# Patient Record
Sex: MALE | Race: OTHER | Employment: STUDENT
[De-identification: names, ages, dates, MRNs, and addresses within clinical notes are randomized per-mention and may not be internally consistent; named-entity substitution may affect disease eponyms.]

---

## 2022-03-18 PROBLEM — S52.92XA CLOSED FRACTURE OF LEFT RADIUS AND ULNA: Status: ACTIVE | Noted: 2017-08-04

## 2022-03-18 PROBLEM — S52.202A CLOSED FRACTURE OF LEFT RADIUS AND ULNA: Status: ACTIVE | Noted: 2017-08-04

## 2022-07-29 ENCOUNTER — TELEMEDICINE (OUTPATIENT)
Dept: PRIMARY CARE CLINIC | Facility: CLINIC | Age: 22
End: 2022-07-29
Payer: COMMERCIAL

## 2022-07-29 DIAGNOSIS — M25.512 CHRONIC LEFT SHOULDER PAIN: Primary | ICD-10-CM

## 2022-07-29 DIAGNOSIS — Z86.19 HISTORY OF CHLAMYDIA: ICD-10-CM

## 2022-07-29 DIAGNOSIS — G89.29 CHRONIC LEFT SHOULDER PAIN: Primary | ICD-10-CM

## 2022-07-29 DIAGNOSIS — Z98.890 HISTORY OF REPAIR OF ACL: ICD-10-CM

## 2022-07-29 PROCEDURE — 99203 OFFICE O/P NEW LOW 30 MIN: CPT | Performed by: FAMILY MEDICINE

## 2022-07-29 ASSESSMENT — PATIENT HEALTH QUESTIONNAIRE - PHQ9
SUM OF ALL RESPONSES TO PHQ9 QUESTIONS 1 & 2: 0
1. LITTLE INTEREST OR PLEASURE IN DOING THINGS: 0
2. FEELING DOWN, DEPRESSED OR HOPELESS: 0
SUM OF ALL RESPONSES TO PHQ QUESTIONS 1-9: 0

## 2022-07-29 ASSESSMENT — ENCOUNTER SYMPTOMS
GASTROINTESTINAL NEGATIVE: 1
RESPIRATORY NEGATIVE: 1
EYES NEGATIVE: 1

## 2022-07-29 NOTE — PROGRESS NOTES
[id number] [address] [facility] [address] Lizette Bright   Office : [phone]  Fax : [phone]    7/29/2022    Subjective: The patient is a 22 y.o. male  who presents for left shoulder pain has sharp pain in the anterior part of the shoulder this been going off and on for several weeks. No recent injury. Remote injury 2000 left arm, closed fracture radius ulna however no residual problems from that injury. He also has a history of ACL tear and repair playing football. And denies any residual problems on the knee. Remote history STD. He denies any concern about STD discharge at present. He is helping his mom who has several medical problems. 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 Denies smoking alcohol or drug abuse. He has fairly good range of movement of left shoulder some sharp pain that comes anteriorly. No past medical history on file.     Social History     Socioeconomic History    Marital status: Single     Spouse name: Not on file    Number of children: Not on file    Years of education: Not on file    Highest education level: Not on file   Occupational History    Not on file   Tobacco Use    Smoking status: Never    Smokeless tobacco: Never   Substance and Sexual Activity    Alcohol use: Never    Drug use: Never    Sexual activity: Not on file   Other Topics Concern    Not on file   Social History Narrative    Not on file     Social Determinants of Health     Financial Resource Strain: Not on file   Food Insecurity: Not on file   Transportation Needs: Not on file   Physical Activity: Not on file   Stress: Not on file   Social Connections: Not on file   Intimate Partner Violence: Not on file   Housing Stability: Not on file       No Known Allergies    Current Outpatient Medications   Medication Sig Dispense Refill    diclofenac (VOLTAREN) 50 MG EC tablet 1 twice a day as needed for pain with food 60 tablet 3     No current facility-administered medications for this visit. Review of Systems   Constitutional: Negative. Negative for activity change, fatigue and fever. HENT: Negative. Eyes: Negative. Respiratory: Negative. Cardiovascular: Negative. Gastrointestinal: Negative. Endocrine: Negative. Genitourinary: Negative. Negative for decreased urine volume, difficulty urinating, penile discharge, penile pain, penile swelling, scrotal swelling and urgency. Musculoskeletal:         Left shoulder pain no recent injury   Allergic/Immunologic: Negative for environmental allergies and food allergies. Neurological: Negative. Hematological: Negative. Psychiatric/Behavioral: Negative. Negative for agitation, behavioral problems, decreased concentration and hallucinations. Objective: Gross head ENT examination normal quite cheerful. No neurological deficits normal gait. Left shoulder essentially normal range of movement there is no obvious deformity he complains of sharp pain that comes and goes anteriorly. He has no residual problems from previous radius ulna fracture. There is no neurological deficits. There were no vitals taken for this visit. No visits with results within 1 Month(s) from this visit.    Latest known visit with results is:   Office Visit on 02/11/2020   Component Date Value Ref Range Status    WBC 02/11/2020 7.1  3.4 - 10.8 x10E3/uL Final    RBC 02/11/2020 5.36  4.14 - 5.80 x10E6/uL Final    Hemoglobin 02/11/2020 14.5  13.0 - 17.7 g/dL Final    Hematocrit 02/11/2020 45.1  37.5 - 51.0 % Final    MCV 02/11/2020 84  79 - 97 fL Final    MCH 02/11/2020 27.1  26.6 - 33.0 pg Final    MCHC 02/11/2020 32.2  31.5 - 35.7 g/dL Final    RDW 02/11/2020 12.8  11.6 - 15.4 % Final    Platelets 72/40/1948 224  150 - 450 x10E3/uL Final    CHLAMYDIA BY FREDDY, [id number] 02/11/2020 Negative  Negative Final    GONOCOCCUS BY FREDDY, [id number] 02/11/2020 Negative  Negative Final    TRICH VAG BY FREDDY 02/11/2020 Negative  Negative Final    Color, UA 02/11/2020 Yellow  Straw-Yellow Final    CLARITY 02/11/2020 Clear  Clear Final    Glucose, Ur 02/11/2020 Negative  Negative mg/dL Final    Bilirubin, Urine 02/11/2020 Negative  Negative mg/dL Final    Ketones, Urine 02/11/2020 Negative  Negative mg/dL Final    Specific Gravity, UA 02/11/2020 1.025  1.000 - 1.030 NA Final    Blood, Urine 02/11/2020 7.0  5.0 - 8.0 NA Final    Blood, Urine 02/11/2020 Negative  Negative VINCENT/UL Final    Protein, UA 02/11/2020 Negative  Negative mg/dL Final    Urobilinogen, UA, POCT 02/11/2020 0.2 E.U./dL  0.0 - 1.0 E.U./dL Final    Nitrite, Urine 02/11/2020 Negative  Negative Final    Leukocyte Esterase, Urine 02/11/2020 Negative  Negative Yasmin/uL Final    RPR 02/11/2020 Non Reactive  Non Reactive Final       Physical Exam    ASSESSMENT/PLAN:    Miguelito was seen today for new patient and shoulder pain. Diagnoses and all orders for this visit:    Chronic left shoulder pain  -     diclofenac (VOLTAREN) 50 MG EC tablet; 1 twice a day as needed for pain with food    History of repair of ACL    History of chlamydia          No follow-up provider specified. Hemalatha Hunter is a 22 y.o. male who was seen by synchronous (real-time) audio-video technology on 7/29/2022. Consent:  He and/or his healthcare decision maker is aware that this patient-initiated Telehealth encounter is a billable service, with coverage as determined by his insurance carrier. He is aware that he may receive a bill and has provided verbal consent to proceed: Home independently    I was independent without device while conducting this encounter. Assessment & Plan:   Stantonfavian Salter was seen today for new patient and shoulder pain.     Diagnoses and all orders for this visit:    Chronic left shoulder pain  -     diclofenac (VOLTAREN) 50 MG EC tablet; 1 twice a day as needed for pain with food    History of repair of ACL    History of chlamydia    Chronic nonspecific left

## 2022-08-02 ENCOUNTER — OFFICE VISIT (OUTPATIENT)
Dept: FAMILY MEDICINE CLINIC | Facility: CLINIC | Age: 22
End: 2022-08-02
Payer: COMMERCIAL

## 2022-08-02 VITALS
WEIGHT: 160.8 LBS | HEART RATE: 69 BPM | DIASTOLIC BLOOD PRESSURE: 66 MMHG | OXYGEN SATURATION: 99 % | BODY MASS INDEX: 23.82 KG/M2 | HEIGHT: 69 IN | RESPIRATION RATE: 19 BRPM | SYSTOLIC BLOOD PRESSURE: 126 MMHG

## 2022-08-02 DIAGNOSIS — M25.512 ACUTE PAIN OF LEFT SHOULDER: Primary | ICD-10-CM

## 2022-08-02 DIAGNOSIS — F43.9 SITUATIONAL STRESS: ICD-10-CM

## 2022-08-02 DIAGNOSIS — R30.0 DYSURIA: ICD-10-CM

## 2022-08-02 DIAGNOSIS — Z00.00 LABORATORY EXAM ORDERED AS PART OF ROUTINE GENERAL MEDICAL EXAMINATION: ICD-10-CM

## 2022-08-02 DIAGNOSIS — Z86.19 HISTORY OF CHLAMYDIA: ICD-10-CM

## 2022-08-02 DIAGNOSIS — Z23 ENCOUNTER FOR IMMUNIZATION: ICD-10-CM

## 2022-08-02 PROBLEM — G89.29 CHRONIC LEFT SHOULDER PAIN: Status: RESOLVED | Noted: 2022-07-29 | Resolved: 2022-08-02

## 2022-08-02 PROBLEM — S52.92XA CLOSED FRACTURE OF LEFT RADIUS AND ULNA: Status: RESOLVED | Noted: 2017-08-04 | Resolved: 2022-08-02

## 2022-08-02 PROBLEM — S52.202A CLOSED FRACTURE OF LEFT RADIUS AND ULNA: Status: RESOLVED | Noted: 2017-08-04 | Resolved: 2022-08-02

## 2022-08-02 PROBLEM — Z98.890 HISTORY OF REPAIR OF ACL: Status: RESOLVED | Noted: 2022-07-29 | Resolved: 2022-08-02

## 2022-08-02 LAB
BILIRUBIN, URINE, POC: NEGATIVE
BLOOD URINE, POC: NEGATIVE
GLUCOSE URINE, POC: NEGATIVE
KETONES, URINE, POC: NEGATIVE
LEUKOCYTE ESTERASE, URINE, POC: NEGATIVE
NITRITE, URINE, POC: NEGATIVE
PH, URINE, POC: 7 (ref 4.6–8)
PROTEIN,URINE, POC: NEGATIVE
SPECIFIC GRAVITY, URINE, POC: 1.02 (ref 1–1.03)
URINALYSIS CLARITY, POC: CLEAR
URINALYSIS COLOR, POC: YELLOW
UROBILINOGEN, POC: NORMAL

## 2022-08-02 PROCEDURE — 99214 OFFICE O/P EST MOD 30 MIN: CPT | Performed by: NURSE PRACTITIONER

## 2022-08-02 PROCEDURE — 81003 URINALYSIS AUTO W/O SCOPE: CPT | Performed by: NURSE PRACTITIONER

## 2022-08-02 RX ORDER — DOXYCYCLINE HYCLATE 100 MG/1
100 CAPSULE ORAL 2 TIMES DAILY
Qty: 14 CAPSULE | Refills: 0 | Status: SHIPPED | OUTPATIENT
Start: 2022-08-02 | End: 2022-08-09

## 2022-08-02 ASSESSMENT — ENCOUNTER SYMPTOMS
ALLERGIC/IMMUNOLOGIC NEGATIVE: 1
TROUBLE SWALLOWING: 0
ABDOMINAL DISTENTION: 0
CONSTIPATION: 0
SINUS PAIN: 0
ABDOMINAL PAIN: 0
SORE THROAT: 0
RESPIRATORY NEGATIVE: 1
COLOR CHANGE: 0
ANAL BLEEDING: 0
COUGH: 0
VOICE CHANGE: 0
WHEEZING: 0
NAUSEA: 0
GASTROINTESTINAL NEGATIVE: 1
STRIDOR: 0
EYE PAIN: 0
RHINORRHEA: 0
EYE DISCHARGE: 0
BLOOD IN STOOL: 0
EYES NEGATIVE: 1
BACK PAIN: 0
RECTAL PAIN: 0
DIARRHEA: 0
CHEST TIGHTNESS: 0
SINUS PRESSURE: 0
FACIAL SWELLING: 0
SHORTNESS OF BREATH: 0
VOMITING: 0

## 2022-08-02 ASSESSMENT — PATIENT HEALTH QUESTIONNAIRE - PHQ9
SUM OF ALL RESPONSES TO PHQ QUESTIONS 1-9: 8
8. MOVING OR SPEAKING SO SLOWLY THAT OTHER PEOPLE COULD HAVE NOTICED. OR THE OPPOSITE, BEING SO FIGETY OR RESTLESS THAT YOU HAVE BEEN MOVING AROUND A LOT MORE THAN USUAL: 0
5. POOR APPETITE OR OVEREATING: 2
7. TROUBLE CONCENTRATING ON THINGS, SUCH AS READING THE NEWSPAPER OR WATCHING TELEVISION: 3
SUM OF ALL RESPONSES TO PHQ9 QUESTIONS 1 & 2: 3
3. TROUBLE FALLING OR STAYING ASLEEP: 0
SUM OF ALL RESPONSES TO PHQ QUESTIONS 1-9: 8
6. FEELING BAD ABOUT YOURSELF - OR THAT YOU ARE A FAILURE OR HAVE LET YOURSELF OR YOUR FAMILY DOWN: 0
SUM OF ALL RESPONSES TO PHQ QUESTIONS 1-9: 8
10. IF YOU CHECKED OFF ANY PROBLEMS, HOW DIFFICULT HAVE THESE PROBLEMS MADE IT FOR YOU TO DO YOUR WORK, TAKE CARE OF THINGS AT HOME, OR GET ALONG WITH OTHER PEOPLE: 1
9. THOUGHTS THAT YOU WOULD BE BETTER OFF DEAD, OR OF HURTING YOURSELF: 0
4. FEELING TIRED OR HAVING LITTLE ENERGY: 0
1. LITTLE INTEREST OR PLEASURE IN DOING THINGS: 2
SUM OF ALL RESPONSES TO PHQ QUESTIONS 1-9: 8
2. FEELING DOWN, DEPRESSED OR HOPELESS: 1

## 2022-08-02 ASSESSMENT — ANXIETY QUESTIONNAIRES
5. BEING SO RESTLESS THAT IT IS HARD TO SIT STILL: 0
GAD7 TOTAL SCORE: 8
4. TROUBLE RELAXING: 2
3. WORRYING TOO MUCH ABOUT DIFFERENT THINGS: 1
7. FEELING AFRAID AS IF SOMETHING AWFUL MIGHT HAPPEN: 0
1. FEELING NERVOUS, ANXIOUS, OR ON EDGE: 2
2. NOT BEING ABLE TO STOP OR CONTROL WORRYING: 0
6. BECOMING EASILY ANNOYED OR IRRITABLE: 3

## 2022-08-02 NOTE — PROGRESS NOTES
[address], [address] [place] [address]  Phone: [phone] Fax [phone]  Randa Lynn. Leandra MS, APRN, FNP-C  8/2/2022   Chief Complaint   Patient presents with    New Patient     Pt is new to this office but not to the system. Here today to estab care. Pt is coming from [address] [place] [address],[address]. Last seen by him 7/29/22. Pt denies having any sig PMH and denies taking any medications. Pt has a few concerns today    Shoulder Pain     Pt reports having left shoulder pain off and on for past few weeks. No known injury. Worse when doing lateral arm raise exercises. Not taking anything for the pain. Denies any edema, erythema, or increased warmth to left shoulder. Denies any weakness to left arm/hand    Dysuria     Pt reports being treated for chlamydia with abx in August 2021. Reports symptoms resolved and pt denies being sexually active since that point. Pt reports however recently developing some mild dysuria over past few days. Denies any penile lesions or d/c, scrotal edema, testicular pain, urgency, frequency, flank pain, phuong hematuria, fever, N/V/D/abd pain etc. Pt concerned that chlamydia may have only been partially treated. Wishes to be retested. Stress     Pt reports having situational stress related to recently having to quit school to come home to help take care of his mother who has liver cancer. Pt denies any sig depression or anxiety however. Has good support from his family and a male mentor. Pt denies any SI, HI, hallucinations. Pt denies needing medications. Is interested in a referral for counseling          ASSESSMENT/PLAN:  Below is the assessment and plan developed based on review of pertinent history, physical exam, labs, studies, and medications. 1. Acute pain of left shoulder  Pt reports having left shoulder pain off and on for past few weeks. No known injury. Worse when doing lateral arm raise exercises.  Not taking anything for the pain. Denies any edema, erythema, or increased warmth to left shoulder. Denies any weakness to left arm/hand. On physical exam today: Pt had mild ttp noted to anterior aspect of left shoulder. No increased edema, erythema, warmth noted. No focal weakness noted to left arm/hand. ROM intact but with mild discomfort on lateral arm raise. Rest of MSK WNL. Gait steady and unassisted. Discussed with pt. Will give pt PRN Diclofenac as directed to see if pain improves. Will also get an x-ray of left shoulder to further evaluate. Pt to f/u with me in 4 weeks to recheck. Agrees to call sooner for concerns/new or worsening symptoms. If not improving by f/u or if left shoulder x-ray is abnormal, will consider referral to PT vs POA. Will monitor.     - diclofenac (VOLTAREN) 50 MG EC tablet; Take 1 tablet by mouth 2 times daily as needed for Pain  Dispense: 60 tablet; Refill: 2  - XR SHOULDER LEFT (MIN 2 VIEWS); Future    2. Dysuria  3. History of chlamydia  Pt reports being treated for chlamydia with abx in August 2021. Reports symptoms resolved and pt denies being sexually active since that point. Pt reports however recently developing some mild dysuria over past few days. Denies any penile lesions or d/c, scrotal edema, testicular pain, urgency, frequency, flank pain, phoung hematuria, fever, N/V/D/abd pain etc. Pt concerned that chlamydia may have only been partially treated. Wishes to be retested. Pt had normal  exam today. UA was WNL today. Discussed with pt. Checked allergies. Given symptoms and hx, will cover empirically with Doxy while awaiting STD screening and urine culture to come back as Doxy is good coverage for UTI and chlamydia. If urine culture and STD screen is negative, pt can stop the Doxy. If chlamydia is positive, Doxy given will cover. If positive for M. Genitalium or gonorrhea, will need further abx coverage.  Pt agrees to stay well hydrated and to avoid any sexual contact until all results are back and proper tx is given. Pt to f/u with me in 4 week. Agrees to call sooner for concerns/new or worsening symptoms.     - CT+NG+M Genitalium by FREDDY, Ur; Future  - AMB POC URINALYSIS DIP STICK AUTO W/O MICRO  - Culture, Urine; Future  - CBC with Auto Differential; Future  - Comprehensive Metabolic Panel; Future  - doxycycline hyclate (VIBRAMYCIN) 100 MG capsule; Take 1 capsule by mouth in the morning and 1 capsule before bedtime. Do all this for 7 days. Dispense: 14 capsule; Refill: 0    4. Situational stress  Pt reports having situational stress related to recently having to quit school to come home to help take care of his mother who has liver cancer. Pt denies any sig depression or anxiety however. Has good support from his family and a male mentor. Pt denies any SI, HI, hallucinations. Pt denies needing medications. Is interested in a referral for counseling. Discussed with pt. Will refer pt to Fahe[facility] Rivera for counseling. I feel that he will greatly benefit from this. Pt encouraged to continue to spend as much time as possible with his family and mentor. Pt to f/u with me in 4 weeks. Agrees to call sooner for concerns/new or worsening symptoms.     - [id number] [facility]    5. Laboratory exam ordered as part of routine general medical examination  Pt to return fasting prior to f/u with me in 4 weeks to have baseline labs drawn. Will discuss findings with pt at f/u appointment. - CBC with Auto Differential; Future  - Comprehensive Metabolic Panel; Future  - Lipid Panel; Future  - TSH; Future  - T4, Free; Future  - Hepatitis C Antibody; Future    6. Encounter for immunization  Pt reports having 1 dose of Covid vaccine, but is unsure of the date. Pt agrees to bring date to next appointment with me so I can update his HCM tab. Pt encouraged to get 2nd dose as well. Pt declined Tdap today. Will continue to encourage pt to get UTD on Tdap at future appointments. repair left ulna fracture     Family History   Problem Relation Age of Onset    Hypertension Mother     Liver Cancer Mother     Hypertension Father     Coronary Art Dis Father     No Known Problems Sister     No Known Problems Sister     No Known Problems Sister     No Known Problems Sister     No Known Problems Brother     No Known Problems Brother     No Known Problems Maternal Grandmother     No Known Problems Maternal Grandfather     No Known Problems Paternal Grandmother     No Known Problems Paternal Grandfather      Social History     Tobacco Use   Smoking Status Never   Smokeless Tobacco Never         Review of Systems   Constitutional: Negative. Negative for appetite change, chills, diaphoresis, fatigue, fever and unexpected weight change. HENT: Negative. Negative for congestion, ear discharge, ear pain, facial swelling, hearing loss, mouth sores, nosebleeds, postnasal drip, rhinorrhea, sinus pressure, sinus pain, sneezing, sore throat, tinnitus, trouble swallowing and voice change. Eyes: Negative. Negative for pain, discharge and visual disturbance. Respiratory: Negative. Negative for cough, chest tightness, shortness of breath, wheezing and stridor. Cardiovascular: Negative. Negative for chest pain, palpitations and leg swelling. Gastrointestinal: Negative. Negative for abdominal distention, abdominal pain, anal bleeding, blood in stool, constipation, diarrhea, nausea, rectal pain and vomiting. Endocrine: Negative. Genitourinary:  Positive for dysuria. Negative for decreased urine volume, difficulty urinating, flank pain, frequency, genital sores, hematuria, penile discharge, penile pain, penile swelling, scrotal swelling, testicular pain and urgency. Pt reports being treated for chlamydia with abx in August 2021. Reports symptoms resolved and pt denies being sexually active since that point. Pt reports however recently developing some mild dysuria over past few days. Denies any penile lesions or d/c, scrotal edema, testicular pain, urgency, frequency, flank pain, phuong hematuria, fever, N/V/D/abd pain etc. Pt concerned that chlamydia may have only been partially treated. Wishes to be retested. Musculoskeletal:  Positive for arthralgias (Left shoulder pain off and on for past few weeks. No known injury. Worse when doing lateral arm raise exercises. Not taking anything for the pain. Denies any edema, erythema, or increased warmth to left shoulder. Denies any weakness to left arm/hand). Negative for back pain, gait problem, joint swelling, myalgias, neck pain and neck stiffness. Skin: Negative. Negative for color change, pallor, rash and wound. Allergic/Immunologic: Negative. Negative for environmental allergies. Neurological: Negative. Negative for dizziness, tremors, syncope, weakness, light-headedness, numbness and headaches. Hematological: Negative. Negative for adenopathy. Does not bruise/bleed easily. Psychiatric/Behavioral:  Positive for dysphoric mood. Negative for hallucinations, self-injury, sleep disturbance and suicidal ideas. The patient is nervous/anxious. Pt reports having situational stress related to recently having to quit school to come home to help take care of his mother who has liver cancer. Pt denies any sig depression or anxiety however. Has good support from his family and a male mentor. Pt denies any SI, HI, hallucinations. Pt denies needing medications. Is interested in a referral for counseling       Vitals:    08/02/22 1055   BP: 126/66   Pulse: 69   Resp: 19   SpO2: 99%       Physical Exam  Vitals reviewed. Constitutional:       General: He is not in acute distress. Appearance: Normal appearance. He is normal weight. He is not ill-appearing, toxic-appearing or diaphoretic. HENT:      Head: Normocephalic and atraumatic. Right Ear: Tympanic membrane, ear canal and external ear normal. There is no impacted cerumen. Left Ear: Tympanic membrane, ear canal and external ear normal. There is no impacted cerumen. Nose: Nose normal. No congestion or rhinorrhea. Mouth/Throat:      Mouth: Mucous membranes are moist.      Pharynx: Oropharynx is clear. No oropharyngeal exudate or posterior oropharyngeal erythema. Eyes:      General: No scleral icterus. Right eye: No discharge. Left eye: No discharge. Extraocular Movements: Extraocular movements intact. Conjunctiva/sclera: Conjunctivae normal.      Pupils: Pupils are equal, round, and reactive to light. Cardiovascular:      Rate and Rhythm: Normal rate and regular rhythm. Pulses: Normal pulses. Heart sounds: Normal heart sounds. No murmur heard. No friction rub. No gallop. Pulmonary:      Effort: Pulmonary effort is normal. No respiratory distress. Breath sounds: Normal breath sounds. No stridor. No wheezing, rhonchi or rales. Chest:      Chest wall: No tenderness. Abdominal:      General: Abdomen is flat. Bowel sounds are normal. There is no distension. Palpations: Abdomen is soft. There is no mass. Tenderness: There is no abdominal tenderness. There is no right CVA tenderness, left CVA tenderness, guarding or rebound. Hernia: No hernia is present. Genitourinary:     Penis: Normal.       Testes: Normal.      Comments: No inguinal hernia felt  Musculoskeletal:         General: Tenderness present. No swelling, deformity or signs of injury. Normal range of motion. Cervical back: Normal range of motion and neck supple. No rigidity or tenderness. Right lower leg: No edema. Left lower leg: No edema. Comments: Focused on left shoulder: Mild ttp noted to anterior aspect of left shoulder. No increased edema, erythema, warmth noted. No focal weakness noted to left arm/hand. ROM intact but with mild discomfort on lateral arm raise. Rest of MSK WNL.  Gait steady and unassisted   Lymphadenopathy: Cervical: No cervical adenopathy. Skin:     General: Skin is warm. Capillary Refill: Capillary refill takes less than 2 seconds. Coloration: Skin is not jaundiced or pale. Findings: No bruising, erythema, lesion or rash. Neurological:      General: No focal deficit present. Mental Status: He is alert and oriented to person, place, and time. Cranial Nerves: No cranial nerve deficit. Sensory: No sensory deficit. Motor: No weakness. Coordination: Coordination normal.      Gait: Gait normal.   Psychiatric:         Mood and Affect: Mood normal.         Behavior: Behavior normal.         Thought Content:  Thought content normal.         Judgment: Judgment normal.     Component      Latest Ref Rng & Units 8/2/2022          11:41 AM   Color, Urine, POC       yellow   Clarity, Urine, POC       clear   Glucose, Urine, POC      Negative Negative   Bilirubin, Urine, POC      Negative Negative   Ketones, Urine, POC      Negative Negative   Specific Gravity, Urine, POC      1.001 - 1.035 1.025   Blood, Urine, POC      Negative negative   pH, Urine, POC      4.6 - 8.0 7.0   Protein, Urine, POC      Negative Negative   Urobilinogen, POC       normal   Nitrate, Urine, POC      Negative negative   Leukocyte Esterase, Urine, POC      Negative Negative     PHQ-9 Total Score: 8 (8/2/2022 10:59 AM)  Thoughts that you would be better off dead, or of hurting yourself in some way: Not at all (8/2/2022 10:59 AM)  EDWARD-7 SCREENING 8/2/2022   Feeling nervous, anxious, or on edge More than half the days   Not being able to stop or control worrying Not at all   Worrying too much about different things Several days   Trouble relaxing More than half the days   Being so restless that it is hard to sit still Not at all   Becoming easily annoyed or irritable Nearly every day   Feeling afraid as if something awful might happen Not at all   EDWARD-7 Total Score 8       PLEASE NOTE:  This document has been produced using voice recognition software. Unrecognized errors in transcription may be present. On this date 8/2/2022 I have spent 30 minutes reviewing previous notes, test results and face to face with the patient discussing the diagnosis and importance of compliance with the treatment plan as well as documenting on the day of the visit. An electronic signature was used to authenticate this note.   -- NICHOLE Astudillo - NP

## 2022-08-04 LAB
BACTERIA SPEC CULT: NORMAL
SERVICE CMNT-IMP: NORMAL

## 2022-08-05 LAB
C TRACH RRNA UR QL NAA+PROBE: NEGATIVE
M GENITALIUM DNA SPEC QL NAA+PROBE: NEGATIVE
N GONORRHOEA RRNA UR QL NAA+PROBE: NEGATIVE
SPECIMEN SOURCE: NORMAL

## 2022-08-22 ENCOUNTER — TELEPHONE (OUTPATIENT)
Dept: FAMILY MEDICINE CLINIC | Facility: CLINIC | Age: 22
End: 2022-08-22

## 2022-08-22 NOTE — TELEPHONE ENCOUNTER
----- Message from Verona Malathi sent at 8/22/2022 11:13 AM EDT -----  Subject: Results Request    QUESTIONS  Results: urine specimen ; Ordered by: Sara Fatima   Date Performed: 2022-08-05  ---------------------------------------------------------------------------  --------------  Warden Smita DUMONT    [phone]; OK to leave message on voicemail  ---------------------------------------------------------------------------  --------------

## 2022-08-23 NOTE — TELEPHONE ENCOUNTER
Patient notified of results and verbalized understanding. [employer] text message sent to patient so he could sign up.

## 2025-03-04 ENCOUNTER — TELEPHONE (OUTPATIENT)
Dept: PRIMARY CARE CLINIC | Facility: CLINIC | Age: 25
End: 2025-03-04